# Patient Record
Sex: MALE | Race: WHITE | NOT HISPANIC OR LATINO | Employment: FULL TIME | URBAN - METROPOLITAN AREA
[De-identification: names, ages, dates, MRNs, and addresses within clinical notes are randomized per-mention and may not be internally consistent; named-entity substitution may affect disease eponyms.]

---

## 2017-09-28 ENCOUNTER — OFFICE VISIT (OUTPATIENT)
Dept: FAMILY MEDICINE | Facility: CLINIC | Age: 22
End: 2017-09-28
Payer: COMMERCIAL

## 2017-09-28 VITALS
HEART RATE: 56 BPM | SYSTOLIC BLOOD PRESSURE: 114 MMHG | BODY MASS INDEX: 30.31 KG/M2 | RESPIRATION RATE: 16 BRPM | TEMPERATURE: 98 F | DIASTOLIC BLOOD PRESSURE: 84 MMHG | WEIGHT: 200 LBS | HEIGHT: 68 IN

## 2017-09-28 DIAGNOSIS — I88.9 CERVICAL LYMPHADENITIS: ICD-10-CM

## 2017-09-28 DIAGNOSIS — L03.811 CELLULITIS OF HEAD OR SCALP: ICD-10-CM

## 2017-09-28 PROCEDURE — 3008F BODY MASS INDEX DOCD: CPT | Mod: ,,, | Performed by: FAMILY MEDICINE

## 2017-09-28 PROCEDURE — 99213 OFFICE O/P EST LOW 20 MIN: CPT | Mod: ,,, | Performed by: FAMILY MEDICINE

## 2017-09-28 RX ORDER — DOXYCYCLINE HYCLATE 100 MG
100 TABLET ORAL EVERY 12 HOURS
Qty: 20 TABLET | Refills: 0 | Status: SHIPPED | OUTPATIENT
Start: 2017-09-28 | End: 2017-10-08

## 2017-09-28 NOTE — PATIENT INSTRUCTIONS
Lactobacillus Oral formulations  What is this medicine?  LACTOBACILLUS (venice DAI uhs) is a supplement. It is used to help the normal balance of bacteria in the colon. This may treat or prevent diarrhea caused by an infection or by antibiotics. The FDA has not approved this supplement for any medical use.  This supplement may be used for other purposes; ask your health care provider or pharmacist if you have questions.  How should I use this medicine?  Take this medicine by mouth with a small amount of milk, fruit juice, or water. Follow the directions on the package labeling, or take as directed by your health care professional. This medicine can be taken with cereal or other food. Do not take this medicine more often than directed.  Contact your pediatrician regarding the use of this medicine in children. Special care may be needed. This medicine is not recommended for children under 3 years old unless prescribed by a doctor.  What side effects may I notice from receiving this medicine?  Side effects that you should report to your doctor or health care professional as soon as possible:  · allergic reactions like skin rash, itching or hives, swelling of the face, lips, or tongue  · breathing problems  · severe nausea, vomiting  · unusually weak or tired  Side effects that usually do not require medical attention (report to your doctor or health care professional if they continue or are bothersome):  · hiccups  · stomach gas  What may interact with this medicine?  Interactions are not expected.  What if I miss a dose?  If you miss a dose, take it as soon as you can. If it is almost time for your next dose, take only that dose. Do not take double or extra doses.  Where should I keep my medicine?  Keep out of the reach of children.  Store in the refrigerator or as directed on the package label. Do not freeze. Throw away any unused medicine after the expiration date.  What should I tell my health care provider  before I take this medicine?  They need to know if you have any of these conditions:  · chronic disease  · immune system problems  · prosthetic heart valve or valvular heart disease  · an unusual or allergic reaction to Lactobacillus, any medicines, lactose or milk, other foods, dyes, or preservatives  · pregnant or trying to get pregnant  · breast-feeding  What should I watch for while using this medicine?  See your doctor if your symptoms do not get better or if they get worse. Do not take this supplement for more than 2 days or if you have a fever unless your doctor tells you to.  If you have allergies to milk or you are sensitive to lactose, do not use this supplement.  NOTE:This sheet is a summary. It may not cover all possible information. If you have questions about this medicine, talk to your doctor, pharmacist, or health care provider. Copyright© 2017 [employer]

## 2017-09-28 NOTE — PROGRESS NOTES
Subjective:       Patient ID: Silverio Hurst is a 22 y.o. male.    Chief Complaint: Insect Bite (on head)    1 occiput noticed on Saturday morning 5-6 dd , that bigger on Sunday and has shrunk and gotten somewhat better since that time. Had some irritation to the left ear but no palpable swollen glands. There was no fever or other systemic symptoms      Insect Bite   Associated symptoms include congestion (sinus) and headaches. Pertinent negatives include no arthralgias, fever, nausea, neck pain or vomiting.     Review of Systems   Constitutional: Negative for fever.   HENT: Positive for congestion (sinus).    Gastrointestinal: Negative for nausea and vomiting.   Musculoskeletal: Negative for arthralgias and neck pain.   Neurological: Positive for headaches.       Objective:      Physical Exam   HENT:   Head:           Assessment:       1. Cellulitis of head or scalp    2. Cervical lymphadenitis        Plan:       Silverio was seen today for insect bite.    Diagnoses and all orders for this visit:    Cellulitis of head or scalp    Cervical lymphadenitis    Other orders  -     doxycycline (VIBRA-TABS) 100 MG tablet; Take 1 tablet (100 mg total) by mouth every 12 (twelve) hours.         Return if symptoms worsen or fail to improve.

## 2018-02-27 ENCOUNTER — OFFICE VISIT (OUTPATIENT)
Dept: SURGERY | Facility: CLINIC | Age: 23
End: 2018-02-27
Payer: COMMERCIAL

## 2018-02-27 VITALS
WEIGHT: 199.94 LBS | DIASTOLIC BLOOD PRESSURE: 84 MMHG | BODY MASS INDEX: 30.3 KG/M2 | SYSTOLIC BLOOD PRESSURE: 114 MMHG | HEIGHT: 68 IN

## 2018-02-27 DIAGNOSIS — K35.80 ACUTE APPENDICITIS, UNSPECIFIED ACUTE APPENDICITIS TYPE: Primary | ICD-10-CM

## 2018-02-27 PROCEDURE — 99024 POSTOP FOLLOW-UP VISIT: CPT | Mod: ,,, | Performed by: SURGERY

## 2018-02-27 NOTE — PROGRESS NOTES
Subjective:       Patient ID: Silverio Hurst is a 22 y.o. male.    Chief Complaint: Post-op Evaluation ([facility] DOS 2/10/18 Laparoscopic Appendectomy)      HPI:  Silverio Hurst is here for post-op. Patient has no systemic complaints. Post operative   pain is under control.  Tolerating diet, no nausea/vomiting.  Having normal bowel movements.    Path - ok      Objective:      Physical Exam   Constitutional: He is oriented to person, place, and time. He is cooperative. No distress.   Abdominal: Soft. He exhibits no distension. There is no tenderness. There is no rebound and no guarding.   Neurological: He is oriented to person, place, and time.   Skin:   Incisions are clean, dry and intact  There is no evidence of infection, hematoma or seroma        Assessment/Plan:   Acute appendicitis, unspecified acute appendicitis type      Staples removed    Follow-up for F/U - As needed.

## 2020-12-29 ENCOUNTER — LAB VISIT (OUTPATIENT)
Dept: PRIMARY CARE CLINIC | Facility: OTHER | Age: 25
End: 2020-12-29
Attending: INTERNAL MEDICINE
Payer: OTHER GOVERNMENT

## 2020-12-29 DIAGNOSIS — R50.9 FEVER: ICD-10-CM

## 2020-12-29 PROCEDURE — U0003 INFECTIOUS AGENT DETECTION BY NUCLEIC ACID (DNA OR RNA); SEVERE ACUTE RESPIRATORY SYNDROME CORONAVIRUS 2 (SARS-COV-2) (CORONAVIRUS DISEASE [COVID-19]), AMPLIFIED PROBE TECHNIQUE, MAKING USE OF HIGH THROUGHPUT TECHNOLOGIES AS DESCRIBED BY CMS-2020-01-R: HCPCS

## 2020-12-30 DIAGNOSIS — U07.1 COVID-19 VIRUS DETECTED: ICD-10-CM

## 2020-12-30 LAB — SARS-COV-2 RNA RESP QL NAA+PROBE: DETECTED

## 2021-05-12 ENCOUNTER — PATIENT MESSAGE (OUTPATIENT)
Dept: RESEARCH | Facility: HOSPITAL | Age: 26
End: 2021-05-12

## 2022-04-06 ENCOUNTER — OCCUPATIONAL HEALTH (OUTPATIENT)
Dept: URGENT CARE | Facility: CLINIC | Age: 27
End: 2022-04-06

## 2022-04-06 PROCEDURE — 80305 PR COLLECTION ONLY DRUG SCREEN: ICD-10-PCS | Mod: S$GLB,,, | Performed by: EMERGENCY MEDICINE

## 2022-04-06 PROCEDURE — 80305 DRUG TEST PRSMV DIR OPT OBS: CPT | Mod: S$GLB,,, | Performed by: EMERGENCY MEDICINE

## 2023-08-31 ENCOUNTER — OCCUPATIONAL HEALTH (OUTPATIENT)
Dept: URGENT CARE | Facility: CLINIC | Age: 28
End: 2023-08-31

## 2023-08-31 PROCEDURE — 80305 DRUG TEST PRSMV DIR OPT OBS: CPT | Mod: S$GLB,,,

## 2023-08-31 PROCEDURE — 80305 PR COLLECTION ONLY DRUG SCREEN: ICD-10-PCS | Mod: S$GLB,,,

## 2024-04-16 ENCOUNTER — OCCUPATIONAL HEALTH (OUTPATIENT)
Dept: URGENT CARE | Facility: CLINIC | Age: 29
End: 2024-04-16

## 2024-04-16 PROCEDURE — 99499 UNLISTED E&M SERVICE: CPT | Mod: S$GLB,,,

## 2024-04-16 PROCEDURE — 80305 DRUG TEST PRSMV DIR OPT OBS: CPT | Mod: S$GLB,,,
